# Patient Record
Sex: MALE | Race: WHITE | Employment: FULL TIME | URBAN - METROPOLITAN AREA
[De-identification: names, ages, dates, MRNs, and addresses within clinical notes are randomized per-mention and may not be internally consistent; named-entity substitution may affect disease eponyms.]

---

## 2017-02-21 RX ORDER — CARVEDILOL 12.5 MG/1
TABLET ORAL
Qty: 60 TAB | Refills: 1 | Status: SHIPPED | OUTPATIENT
Start: 2017-02-21 | End: 2017-03-13 | Stop reason: SDUPTHER

## 2017-03-13 ENCOUNTER — OFFICE VISIT (OUTPATIENT)
Dept: CARDIOLOGY CLINIC | Age: 62
End: 2017-03-13

## 2017-03-13 VITALS
RESPIRATION RATE: 16 BRPM | SYSTOLIC BLOOD PRESSURE: 124 MMHG | HEIGHT: 67 IN | HEART RATE: 71 BPM | WEIGHT: 180.1 LBS | DIASTOLIC BLOOD PRESSURE: 74 MMHG | BODY MASS INDEX: 28.27 KG/M2 | OXYGEN SATURATION: 97 %

## 2017-03-13 DIAGNOSIS — I25.10 ATHEROSCLEROSIS OF NATIVE CORONARY ARTERY OF NATIVE HEART WITHOUT ANGINA PECTORIS: ICD-10-CM

## 2017-03-13 DIAGNOSIS — Z95.5 S/P CORONARY ARTERY STENT PLACEMENT: ICD-10-CM

## 2017-03-13 DIAGNOSIS — I10 ESSENTIAL HYPERTENSION: ICD-10-CM

## 2017-03-13 DIAGNOSIS — E78.5 HYPERLIPIDEMIA, UNSPECIFIED HYPERLIPIDEMIA TYPE: Primary | ICD-10-CM

## 2017-03-13 NOTE — PROGRESS NOTES
3/13/2017 4:04 PM      Subjective:     Krishna Su is here for f/u visit. Doing very well from CV standpoint. He denies chest pain, chest pressure/discomfort, dyspnea, palpitations, irregular heart beats, near-syncope, syncope, fatigue, orthopnea, paroxysmal nocturnal dyspnea, exertional chest pressure/discomfort, claudication. Exercise regularly. Visit Vitals    /74 (BP 1 Location: Left arm, BP Patient Position: Sitting)    Pulse 71    Resp 16    Ht 5' 7\" (1.702 m)    Wt 180 lb 1.6 oz (81.7 kg)    SpO2 97%    BMI 28.21 kg/m2     Current Outpatient Prescriptions   Medication Sig    IRON, FERROUS SULFATE, PO Take  by mouth daily.  atorvastatin (LIPITOR) 40 mg tablet TAKE 1 TABLET BY MOUTH EVERY DAY    carvedilol (COREG) 12.5 mg tablet TAKE 1 TABLET BY MOUTH TWICE DAILY WITH FOOD    valsartan-hydrochlorothiazide (DIOVAN-HCT) 160-25 mg per tablet TAKE 1 TABLET BY MOUTH EVERY DAY    aspirin delayed-release (ASPIR-LOW) 81 mg tablet Take 1 Tab by mouth daily.  ascorbic acid (VITAMIN C) 1,000 mg tablet Take 1,000 mg by mouth daily.  fish oil-dha-epa 1,200-144-216 mg cap Take 1,000 mg by mouth daily.  folic acid 312 mcg tablet Take 800 mcg by mouth daily.  multivitamin (ONE A DAY) tablet Take 1 Tab by mouth daily.  gabapentin (NEURONTIN) 300 mg capsule Start taking 1 tablet qhs x 3 days, then increase to 1 tablet BID x 3 days, then increase to 1 tablet TID. No current facility-administered medications for this visit. Objective:      Visit Vitals    /74 (BP 1 Location: Left arm, BP Patient Position: Sitting)    Pulse 71    Resp 16    Ht 5' 7\" (1.702 m)    Wt 180 lb 1.6 oz (81.7 kg)    SpO2 97%    BMI 28.21 kg/m2       Data Review:     EKG: Normal sinus rhythm, LAD, inferior T wave inversion, unchanged.      Reviewed and/or ordered active problem list, medication list tests    Past Medical History:   Diagnosis Date    CAD (coronary artery disease)     Heart failure ([address]     S/P coronary artery stent placement 12/1/2014 12/1/14 PCI/LISE to OM1      Past Surgical History:   Procedure Laterality Date    CARDIAC SURG PROCEDURE UNLIST      3 stents placed Jan 2005.  HX OTHER SURGICAL      Cyst removed from next. No Known Allergies   Family History   Problem Relation Age of Onset    Hypertension Mother     Heart Attack Maternal Grandmother       Social History     Social History    Marital status:      Spouse name: N/A    Number of children: N/A    Years of education: N/A     Occupational History    Not on file. Social History Main Topics    Smoking status: Former Smoker    Smokeless tobacco: Not on file    Alcohol use Yes      Comment: Daily    Drug use: No    Sexual activity: Not on file     Other Topics Concern    Not on file     Social History Narrative         Review of Systems     General: Not Present- Anorexia, Chills, Dietary Changes, Fatigue, Fever, Medication Changes, Night Sweats, Weight Gain > 10lbs. and Weight Loss > 10lbs. .  Skin: Not Present- Bruising and Excessive Sweating. HEENT: Not Present- Headache, Visual Loss and Vertigo. Respiratory: Not Present- Cough, Decreased Exercise Tolerance, Difficulty Breathing, Snoring and Wheezing. Cardiovascular: Not Present- Abnormal Blood Pressure, Chest Pain, Claudications, Difficulty Breathing On Exertion, Edema, Fainting / Blacking Out, Irregular Heart Beat, Night Cramps, Orthopnea, Palpitations, Paroxysmal Nocturnal Dyspnea, Rapid Heart Rate, Shortness of Breath and Swelling of Extremities. Gastrointestinal: Not Present- Black, Tarry Stool, Bloody Stool, Diarrhea, Hematemesis, Rectal Bleeding and Vomiting. Musculoskeletal: Not Present- Muscle Pain and Muscle Weakness. Neurological: Not Present- Dizziness. Psychiatric: Not Present- Depression. Endocrine: Not Present- Cold Intolerance, Heat Intolerance and Thyroid Problems.   Hematology: Not Present- Abnormal Bleeding, Anemia, Blood Clots and Easy Bruising.       Physical Exam   The physical exam findings are as follows:       General   Mental Status - Alert. General Appearance - Not in acute distress. Chest and Lung Exam   Inspection: Accessory muscles - No use of accessory muscles in breathing. Auscultation:   Breath sounds: - Normal.      Cardiovascular   Inspection: Jugular vein - Bilateral - Inspection Normal.  Palpation/Percussion:   Apical Impulse: - Normal.  Auscultation: Rhythm - Regular. Heart Sounds - S1 WNL and S2 WNL. No S3 or S4. Murmurs & Other Heart Sounds: Auscultation of the heart reveals - No Murmurs. Carotid arteries - No Carotid bruit. Peripheral Vascular   Upper Extremity: Inspection - Bilateral - No Cyanotic nailbeds or Digital clubbing. Lower Extremity:   Palpation: Dorsalis pedis pulse - Bilateral - Normal. Posterior tibia pulse - Bilateral - Normal. Edema - Bilateral - No edema. Assessment:       ICD-10-CM ICD-9-CM    1. Hyperlipidemia, unspecified hyperlipidemia type E78.5 272.4 AMB POC EKG ROUTINE W/ 12 LEADS, INTER & REP      LIPID PANEL      METABOLIC PANEL, COMPREHENSIVE   2. Atherosclerosis of native coronary artery of native heart without angina pectoris I25.10 414.01 LIPID PANEL      METABOLIC PANEL, COMPREHENSIVE   3. Essential hypertension I10 401.9 LIPID PANEL      METABOLIC PANEL, COMPREHENSIVE   4. S/P coronary artery stent placement Z95.5 V45.82 LIPID PANEL      METABOLIC PANEL, COMPREHENSIVE       Plan:     1. CADI: s/p OM PCI after NSTEMI in 12/014. Stable. 2. HTN. controlled. Continue home meds. 3. Hyperlipidemia: on statin. Check labs. 4. Diet and exercise.

## 2017-03-13 NOTE — PROGRESS NOTES
Chief Complaint   Patient presents with    Cholesterol Problem     annual f/u    Hypertension     \"

## 2017-03-13 NOTE — MR AVS SNAPSHOT
Visit Information Date & Time Provider Department Dept. Phone Encounter #  
 3/13/2017  3:30 PM April Gamez, [address] [facility] [phone] [id number] Follow-up Instructions Return in about 1 year (around 3/13/2018). Upcoming Health Maintenance Date Due Hepatitis C Screening 1955 DTaP/Tdap/Td series (1 - Tdap) 7/6/1978 FOBT Q 1 YEAR AGE 50-75 7/6/2007 INFLUENZA AGE 9 TO ADULT 8/1/2016 Allergies as of 3/13/2017  Review Complete On: 3/13/2017 By: April Gamez MD  
 No Known Allergies Current Immunizations  Never Reviewed Name Date Influenza Vaccine 10/1/2014 Not reviewed this visit You Were Diagnosed With   
  
 Codes Comments Hyperlipidemia, unspecified hyperlipidemia type    -  Primary ICD-10-CM: E78.5 ICD-9-CM: 272.4 Atherosclerosis of native coronary artery of native heart without angina pectoris     ICD-10-CM: I25.10 ICD-9-CM: 414.01 Essential hypertension     ICD-10-CM: I10 
ICD-9-CM: 401.9 S/P coronary artery stent placement     ICD-10-CM: Z95.5 ICD-9-CM: V45.82 Vitals BP Pulse Resp Height(growth percentile) Weight(growth percentile) SpO2  
 124/74 (BP 1 Location: Left arm, BP Patient Position: Sitting) 71 16 5' 7\" (1.702 m) 180 lb 1.6 oz (81.7 kg) 97% BMI Smoking Status 28.21 kg/m2 Former Smoker Vitals History BMI and BSA Data Body Mass Index Body Surface Area  
 28.21 kg/m 2 1.97 m 2 Preferred Pharmacy Pharmacy Name Phone [id number] Christiane Barrera, [address] [facility] [phone] Your Updated Medication List  
  
   
This list is accurate as of: 3/13/17  4:05 PM.  Always use your most recent med list.  
  
  
  
  
 aspirin delayed-release 81 mg tablet Commonly known as:  ASPIR-LOW Take 1 Tab by mouth daily. atorvastatin 40 mg tablet Commonly known as:  LIPITOR TAKE 1 TABLET BY MOUTH EVERY DAY  
  
 carvedilol 12.5 mg tablet Commonly known as:  COREG  
TAKE 1 TABLET BY MOUTH TWICE DAILY WITH FOOD  
  
 fish oil-dha-epa 1,200-144-216 mg Cap Take 1,000 mg by mouth daily. folic acid 369 mcg tablet Take 800 mcg by mouth daily. gabapentin 300 mg capsule Commonly known as:  NEURONTIN Start taking 1 tablet qhs x 3 days, then increase to 1 tablet BID x 3 days, then increase to 1 tablet TID. IRON (FERROUS SULFATE) PO Take  by mouth daily. multivitamin tablet Commonly known as:  ONE A DAY Take 1 Tab by mouth daily. valsartan-hydroCHLOROthiazide 160-25 mg per tablet Commonly known as:  DIOVAN-HCT  
TAKE 1 TABLET BY MOUTH EVERY DAY  
  
 VITAMIN C 1,000 mg tablet Generic drug:  ascorbic acid (vitamin C) Take 1,000 mg by mouth daily. We Performed the Following AMB POC EKG ROUTINE W/ 12 LEADS, INTER & REP [17562 CPT(R)] LIPID PANEL [80916 CPT(R)] METABOLIC PANEL, COMPREHENSIVE [76472 CPT(R)] Follow-up Instructions Return in about 1 year (around 3/13/2018). Please provide this summary of care documentation to your next provider. Your primary care clinician is listed as [address] [facility]. If you have any questions after today's visit, please call [phone].

## 2017-03-29 LAB
ALBUMIN SERPL-MCNC: 4.4 G/DL (ref 3.5–5.5)
ALBUMIN/GLOB SERPL: 2.3 {RATIO} (ref 1.2–2.2)
ALP SERPL-CCNC: 45 IU/L (ref 39–117)
ALT SERPL-CCNC: 22 IU/L (ref 0–44)
AST SERPL-CCNC: 19 IU/L (ref 0–40)
BILIRUB SERPL-MCNC: 0.5 MG/DL (ref 0–1.2)
BUN SERPL-MCNC: 15 MG/DL (ref 6–24)
BUN/CREAT SERPL: 17 (ref 9–20)
CALCIUM SERPL-MCNC: 9.3 MG/DL (ref 8.7–10.2)
CHLORIDE SERPL-SCNC: 99 MMOL/L (ref 96–106)
CHOLEST SERPL-MCNC: 167 MG/DL (ref 100–199)
CO2 SERPL-SCNC: 26 MMOL/L (ref 18–29)
CREAT SERPL-MCNC: 0.87 MG/DL (ref 0.76–1.27)
GLOBULIN SER CALC-MCNC: 1.9 G/DL (ref 1.5–4.5)
GLUCOSE SERPL-MCNC: 96 MG/DL (ref 65–99)
HDLC SERPL-MCNC: 37 MG/DL
INTERPRETATION, 910389: NORMAL
LDLC SERPL CALC-MCNC: 101 MG/DL (ref 0–99)
POTASSIUM SERPL-SCNC: 4.2 MMOL/L (ref 3.5–5.2)
PROT SERPL-MCNC: 6.3 G/DL (ref 6–8.5)
SODIUM SERPL-SCNC: 139 MMOL/L (ref 134–144)
TRIGL SERPL-MCNC: 144 MG/DL (ref 0–149)
VLDLC SERPL CALC-MCNC: 29 MG/DL (ref 5–40)

## 2017-03-31 ENCOUNTER — TELEPHONE (OUTPATIENT)
Dept: CARDIOLOGY CLINIC | Age: 62
End: 2017-03-31

## 2017-03-31 NOTE — TELEPHONE ENCOUNTER
----- Message from Juan M Isbell MD sent at 3/31/2017  8:04 AM EDT -----  Inform him labs are ok. Continue current med. Follow diet and exercise. Called pt and left message to call me back. Returned pt's call,verified pt with two pt identifiers, told pt his labs are okay,continue current meds and follow diet and exercise. He verbalized that he understood everything.

## 2017-04-13 RX ORDER — VALSARTAN AND HYDROCHLOROTHIAZIDE 160; 25 MG/1; MG/1
TABLET ORAL
Qty: 90 TAB | Refills: 0 | Status: SHIPPED | OUTPATIENT
Start: 2017-04-13 | End: 2017-07-07 | Stop reason: SDUPTHER

## 2017-04-26 RX ORDER — CARVEDILOL 12.5 MG/1
TABLET ORAL
Qty: 60 TAB | Refills: 1 | Status: SHIPPED | OUTPATIENT
Start: 2017-04-26 | End: 2017-06-12 | Stop reason: SDUPTHER

## 2017-05-11 RX ORDER — ATORVASTATIN CALCIUM 40 MG/1
TABLET, FILM COATED ORAL
Qty: 90 TAB | Refills: 0 | Status: SHIPPED | OUTPATIENT
Start: 2017-05-11 | End: 2017-08-02 | Stop reason: SDUPTHER

## 2017-06-12 RX ORDER — CARVEDILOL 12.5 MG/1
TABLET ORAL
Qty: 60 TAB | Refills: 1 | Status: SHIPPED | OUTPATIENT
Start: 2017-06-12 | End: 2017-09-04 | Stop reason: SDUPTHER

## 2017-07-10 RX ORDER — VALSARTAN AND HYDROCHLOROTHIAZIDE 160; 25 MG/1; MG/1
TABLET ORAL
Qty: 90 TAB | Refills: 0 | Status: SHIPPED | OUTPATIENT
Start: 2017-07-10 | End: 2017-10-09

## 2017-08-02 RX ORDER — ATORVASTATIN CALCIUM 40 MG/1
TABLET, FILM COATED ORAL
Qty: 90 TAB | Refills: 0 | Status: SHIPPED | OUTPATIENT
Start: 2017-08-02 | End: 2017-10-30 | Stop reason: SDUPTHER

## 2017-09-04 RX ORDER — CARVEDILOL 12.5 MG/1
TABLET ORAL
Qty: 60 TAB | Refills: 1 | Status: SHIPPED | OUTPATIENT
Start: 2017-09-04 | End: 2017-10-09

## 2017-10-09 ENCOUNTER — OFFICE VISIT (OUTPATIENT)
Dept: NEUROLOGY | Age: 62
End: 2017-10-09

## 2017-10-09 VITALS
BODY MASS INDEX: 28.66 KG/M2 | RESPIRATION RATE: 18 BRPM | SYSTOLIC BLOOD PRESSURE: 132 MMHG | OXYGEN SATURATION: 98 % | WEIGHT: 183 LBS | DIASTOLIC BLOOD PRESSURE: 86 MMHG

## 2017-10-09 DIAGNOSIS — G56.21 ULNAR NEUROPATHY OF RIGHT UPPER EXTREMITY: ICD-10-CM

## 2017-10-09 DIAGNOSIS — G56.01 CARPAL TUNNEL SYNDROME OF RIGHT WRIST: ICD-10-CM

## 2017-10-09 DIAGNOSIS — R20.2 PARESTHESIA OF LEFT ARM: Primary | ICD-10-CM

## 2017-10-09 RX ORDER — VALSARTAN AND HYDROCHLOROTHIAZIDE 160; 25 MG/1; MG/1
TABLET ORAL
Qty: 90 TAB | Refills: 0 | Status: SHIPPED | OUTPATIENT
Start: 2017-10-09 | End: 2018-01-09 | Stop reason: SDUPTHER

## 2017-10-09 NOTE — MR AVS SNAPSHOT
Visit Information Date & Time Provider Department Dept. Phone Encounter #  
 10/9/2017 11:40 AM Kandace Libman, DO [facility] [phone] Upcoming Health Maintenance Date Due Hepatitis C Screening 1955 DTaP/Tdap/Td series (1 - Tdap) 7/6/1978 FOBT Q 1 YEAR AGE 50-75 7/6/2007 ZOSTER VACCINE AGE 60> 5/6/2017 INFLUENZA AGE 9 TO ADULT 8/1/2017 Allergies as of 10/9/2017  Review Complete On: 10/9/2017 By: Kandace Libman, DO No Known Allergies Current Immunizations  Never Reviewed Name Date Influenza Vaccine 10/1/2014 Not reviewed this visit You Were Diagnosed With   
  
 Codes Comments Paresthesia of left arm    -  Primary ICD-10-CM: R20.2 ICD-9-CM: 782.0 Carpal tunnel syndrome of right wrist     ICD-10-CM: G56.01 
ICD-9-CM: 354.0 Ulnar neuropathy of right upper extremity     ICD-10-CM: G56.21 ICD-9-CM: 209. 2 Vitals BP Resp Weight(growth percentile) SpO2 BMI Smoking Status 132/86 18 183 lb (83 kg) 98% 28.66 kg/m2 Former Smoker BMI and BSA Data Body Mass Index Body Surface Area  
 28.66 kg/m 2 1.98 m 2 Preferred Pharmacy Pharmacy Name Phone [id number] Ryley Ford, [address] [place] [address] [phone] Your Updated Medication List  
  
   
This list is accurate as of: 10/9/17 11:57 AM.  Always use your most recent med list.  
  
  
  
  
 aspirin delayed-release 81 mg tablet Commonly known as:  ASPIR-LOW Take 1 Tab by mouth daily. atorvastatin 40 mg tablet Commonly known as:  LIPITOR  
TAKE 1 TABLET BY MOUTH EVERY DAY  
  
 * carvedilol 12.5 mg tablet Commonly known as:  COREG  
TAKE 1 TABLET BY MOUTH TWICE DAILY WITH FOOD * carvedilol 12.5 mg tablet Commonly known as:  COREG  
TAKE 1 TABLET BY MOUTH TWICE DAILY WITH FOOD  
  
 fish oil-dha-epa 1,200-144-216 mg Cap Take 1,000 mg by mouth daily. folic acid 745 mcg tablet Take 800 mcg by mouth daily. gabapentin 300 mg capsule Commonly known as:  NEURONTIN Start taking 1 tablet qhs x 3 days, then increase to 1 tablet BID x 3 days, then increase to 1 tablet TID. IRON (FERROUS SULFATE) PO Take  by mouth daily. multivitamin tablet Commonly known as:  ONE A DAY Take 1 Tab by mouth daily. * valsartan-hydroCHLOROthiazide 160-25 mg per tablet Commonly known as:  DIOVAN-HCT  
TAKE 1 TABLET BY MOUTH EVERY DAY  
  
 * valsartan-hydroCHLOROthiazide 160-25 mg per tablet Commonly known as:  DIOVAN-HCT  
TAKE 1 TABLET BY MOUTH EVERY DAY  
  
 VITAMIN C 1,000 mg tablet Generic drug:  ascorbic acid (vitamin C) Take 1,000 mg by mouth daily. * Notice: This list has 4 medication(s) that are the same as other medications prescribed for you. Read the directions carefully, and ask your doctor or other care provider to review them with you. To-Do List   
 10/09/2017 Neurology:  [facility] Patient Instructions A Healthy Lifestyle: Care Instructions Your Care Instructions A healthy lifestyle can help you feel good, stay at a healthy weight, and have plenty of energy for both work and play. A healthy lifestyle is something you can share with your whole family. A healthy lifestyle also can lower your risk for serious health problems, such as high blood pressure, heart disease, and diabetes. You can follow a few steps listed below to improve your health and the health of your family. Follow-up care is a key part of your treatment and safety. Be sure to make and go to all appointments, and call your doctor if you are having problems. Its also a good idea to know your test results and keep a list of the medicines you take. How can you care for yourself at home? · Do not eat too much sugar, fat, or fast foods.  You can still have dessert and treats now and then. The goal is moderation. · Start small to improve your eating habits. Pay attention to portion sizes, drink less juice and soda pop, and eat more fruits and vegetables. ¨ Eat a healthy amount of food. A 3-ounce serving of meat, for example, is about the size of a deck of cards. Fill the rest of your plate with vegetables and whole grains. ¨ Limit the amount of soda and sports drinks you have every day. Drink more water when you are thirsty. ¨ Eat at least 5 servings of fruits and vegetables every day. It may seem like a lot, but it is not hard to reach this goal. A serving or helping is 1 piece of fruit, 1 cup of vegetables, or 2 cups of leafy, raw vegetables. Have an apple or some carrot sticks as an afternoon snack instead of a candy bar. Try to have fruits and/or vegetables at every meal. 
· Make exercise part of your daily routine. You may want to start with simple activities, such as walking, bicycling, or slow swimming. Try to be active 30 to 60 minutes every day. You do not need to do all 30 to 60 minutes all at once. For example, you can exercise 3 times a day for 10 or 20 minutes. Moderate exercise is safe for most people, but it is always a good idea to talk to your doctor before starting an exercise program. 
· Keep moving. Karyna Ramachandran the lawn, work in the garden, or Punctil. Take the stairs instead of the elevator at work. · If you smoke, quit. People who smoke have an increased risk for heart attack, stroke, cancer, and other lung illnesses. Quitting is hard, but there are ways to boost your chance of quitting tobacco for good. ¨ Use nicotine gum, patches, or lozenges. ¨ Ask your doctor about stop-smoking programs and medicines. ¨ Keep trying.  
In addition to reducing your risk of diseases in the future, you will notice some benefits soon after you stop using tobacco. If you have shortness of breath or asthma symptoms, they will likely get better within a few weeks after you quit. · Limit how much alcohol you drink. Moderate amounts of alcohol (up to 2 drinks a day for men, 1 drink a day for women) are okay. But drinking too much can lead to liver problems, high blood pressure, and other health problems. Family health If you have a family, there are many things you can do together to improve your health. · Eat meals together as a family as often as possible. · Eat healthy foods. This includes fruits, vegetables, lean meats and dairy, and whole grains. · Include your family in your fitness plan. Most people think of activities such as jogging or tennis as the way to fitness, but there are many ways you and your family can be more active. Anything that makes you breathe hard and gets your heart pumping is exercise. Here are some tips: 
¨ Walk to do errands or to take your child to school or the bus. ¨ Go for a family bike ride after dinner instead of watching TV. Where can you learn more? Go to http://kesha-skye.info/. Enter M182 in the search box to learn more about \"A Healthy Lifestyle: Care Instructions. \" Current as of: July 26, 2016 Content Version: 11.3 © 9035-5464 [employer]. Care instructions adapted under license by [employer] (which disclaims liability or warranty for this information). If you have questions about a medical condition or this instruction, always ask your healthcare professional. [place] [address] any warranty or liability for your use of this information. Introducing [facility] & HEALTH SERVICES! [facility] introduces Stealth10 patient portal. Now you can access parts of your medical record, email your doctor's office, and request medication refills online. 1. In your internet browser, go to [email or web address] 2. Click on the First Time User? Click Here link in the Sign In box. You will see the New Member Sign Up page. 3. Enter your Trippy Access Code exactly as it appears below. You will not need to use this code after youve completed the sign-up process. If you do not sign up before the expiration date, you must request a new code. · Trippy Access Code: [email or web address] Expires: 1/7/2018 11:38 AM 
 
4. Enter the last four digits of your Social Security Number (xxxx) and Date of Birth (mm/dd/yyyy) as indicated and click Submit. You will be taken to the next sign-up page. 5. Create a Trippy ID. This will be your Trippy login ID and cannot be changed, so think of one that is secure and easy to remember. 6. Create a Trippy password. You can change your password at any time. 7. Enter your Password Reset Question and Answer. This can be used at a later time if you forget your password. 8. Enter your e-mail address. You will receive e-mail notification when new information is available in [address] 9. Click Sign Up. You can now view and download portions of your medical record. 10. Click the Download Summary menu link to download a portable copy of your medical information. If you have questions, please visit the Frequently Asked Questions section of the Trippy website. Remember, Trippy is NOT to be used for urgent needs. For medical emergencies, dial 911. Now available from your iPhone and Android! Please provide this summary of care documentation to your next provider. If you have any questions after today's visit, please call [phone].

## 2017-10-09 NOTE — PROGRESS NOTES
Neurology Clinic Follow up Note    Patient ID:  Anabella Amador  [id number]  30 y.o.  1955      Mr. Misha Queen is here for follow up today of     Last Appointment With Me:  Dr. Luis Madrid 4/18/16    Interval History:   Pt reports his left arm from the elbow down is tingling and numb, worse in the evenings. No associated weakness. No prior LUE EMG. Also still having sharp stabbing pain over the R elbow when lifting weights as well as numbness tingling involving the entire hand. He feels sx are progressive. He endorses ongoing weakness into the R hand when gripping objects. No similar sx into the LE. Not taking anything for his neuropathic pain sx presently and not interesting in starting medication for neuropathic pain. Prior MRI C spine with cervical spondylosis C4-5, C5-6 and C6-7 with minimal/mild stenosis, no cord compression and left foramina stenosis C4-5       PMHx/ PSHx/ FHx/ SHx:  Reviewed and unchanged previous visit. Past Medical History:   Diagnosis Date    CAD (coronary artery disease)     Heart failure ([facility] [address]     S/P coronary artery stent placement 12/1/2014 12/1/14 PCI/LISE to OM1       ROS:  Comprehensive review of systems negative except for as noted above. Objective:       Meds:  Current Outpatient Prescriptions   Medication Sig Dispense Refill    atorvastatin (LIPITOR) 40 mg tablet TAKE 1 TABLET BY MOUTH EVERY DAY 90 Tab 0    IRON, FERROUS SULFATE, PO Take  by mouth daily.  carvedilol (COREG) 12.5 mg tablet TAKE 1 TABLET BY MOUTH TWICE DAILY WITH FOOD 180 Tab 0    valsartan-hydrochlorothiazide (DIOVAN-HCT) 160-25 mg per tablet TAKE 1 TABLET BY MOUTH EVERY DAY 90 Tab 0    aspirin delayed-release (ASPIR-LOW) 81 mg tablet Take 1 Tab by mouth daily. 90 Tab 6    ascorbic acid (VITAMIN C) 1,000 mg tablet Take 1,000 mg by mouth daily.  fish oil-dha-epa 1,200-144-216 mg cap Take 1,000 mg by mouth daily.  folic acid 680 mcg tablet Take 800 mcg by mouth daily.  multivitamin (ONE A DAY) tablet Take 1 Tab by mouth daily. Exam:  Visit Vitals    /86    Resp 18    Wt 83 kg (183 lb)    SpO2 98%    BMI 28.66 kg/m2     NEUROLOGICAL EXAM:  General: Awake, alert, speech fluent  CN: PERRL, EOMI without nystagmus, VFF to confrontation, facial sensation and strength are normal and symmetric, hearing is intact to finger rub bilaterally, palate and tongue movements are intact and symmetric. Motor: Normal tone and muscle bulk with no pronator drift. No atrophy or fasciculations present on examination. Individual muscle group testing:  Shoulder abduction:   Left:5/5   Right : 5/5    Shoulder adduction:   Left:5/5   Right : 5/5    Elbow flexion:      Left:5/5   Right : 5/5  Elbow extension:    Left:5/5   Right : 5/5   Wrist flexion:    Left:5/5   Right : 5/5  Wrist extension:    Left:5/5   Right : 5/5  Arm pronation:   Left:5/5   Right : 5/5  Arm supination:   Left:5/5   Right : 5/5    Finger flexion:    Left:5/5   Right : 5/5    Finger extension:   Left:5/5   Right : 5/5   Finger abduction:  Left:5/5   Right : 4+/5   Finger adduction:   Left:5/5   Right : 5/5  FDI:     Left:5/5   Right : 4+/5     ADM:     Left:5/5   Right : 4+/5   FDP 2-3:    Left:5/5   Right : 5/5    FDP 4-5:    Left:5/5   Right : 5-/5   FPL:     Left:5/5  Right : 5/5   APB:     Left:5-/5   Right : 5-/5    Hip flexion:     Left:5/5   Right : 5/5         Hip extension:   Left:5/5   Right : 5/5    Knee flexion:     Left:5/5   Right : 5/5    Knee extension:   Left:5/5   Right : 5/5    Dorsiflexion:     Left:5/5   Right : 5/5  Plantar flexion:    Left:5/5   Right : 5/5    Foot inversion:    Left:5/5   Right : 5/5   Foot eversion:    Left:5/5   Right : 5/5  Toe flexion:      Left:5/5   Right : 5/5          Toe extension:    Left:5/5   Right : 5/5    · MSRs: No crossed adductors or clonus.          RIGHT  LEFT   Brachioradialis 3+ 3+   Biceps 3+ 3+   Triceps 2+ 2+   Knee 2+ 2+   Achilles 1+ 1+ Plantar response Downward Downward   Rojas/Troemner signs Negative Negative     · Sensation: Normal and symmetric throughout  · Coordination: No ataxia  · Gait: Normal-based, steady          LABS  Results for orders placed or performed in visit on 03/13/17   LIPID PANEL   Result Value Ref Range    Cholesterol, total 167 100 - 199 mg/dL    Triglyceride 144 0 - 149 mg/dL    HDL Cholesterol 37 (L) >39 mg/dL    VLDL, calculated 29 5 - 40 mg/dL    LDL, calculated 101 (H) 0 - 99 mg/dL   METABOLIC PANEL, COMPREHENSIVE   Result Value Ref Range    Glucose 96 65 - 99 mg/dL    BUN 15 6 - 24 mg/dL    Creatinine 0.87 0.76 - 1.27 mg/dL    GFR est non-AA 94 >59 mL/min/1.73    GFR est  >59 mL/min/1.73    BUN/Creatinine ratio 17 9 - 20    Sodium 139 134 - 144 mmol/L    Potassium 4.2 3.5 - 5.2 mmol/L    Chloride 99 96 - 106 mmol/L    CO2 26 18 - 29 mmol/L    Calcium 9.3 8.7 - 10.2 mg/dL    Protein, total 6.3 6.0 - 8.5 g/dL    Albumin 4.4 3.5 - 5.5 g/dL    GLOBULIN, TOTAL 1.9 1.5 - 4.5 g/dL    A-G Ratio 2.3 (H) 1.2 - 2.2    Bilirubin, total 0.5 0.0 - 1.2 mg/dL    Alk. phosphatase 45 39 - 117 IU/L    AST (SGOT) 19 0 - 40 IU/L    ALT (SGPT) 22 0 - 44 IU/L   CVD REPORT   Result Value Ref Range    INTERPRETATION Note        IMAGING:  MRI Results (most recent):    Results from [place] encounter on 02/05/16   MRI CERV SPINE WO CONT   Narrative **Final Report**      ICD Codes / Adm. Diagnosis: 728.87  M62.81 / Muscle weakness (generalized)    Muscle weakness (generalized)  Examination:  MR C SPINE WO CON  - [id number] - Feb 5 2016  3:15PM  Accession No:  [id number]  Reason:  distal RUE weakness      REPORT:  EXAM:  MR C SPINE WO CON  INDICATION:  distal RUE weakness, loss mobility right hand, M62.81, right   wrist pain  TECHNIQUE: Sagittal T1, T2, STIR and axial T1 and T2-weighted images of the   cervical spine were obtained. COMPARISON: None available.   FINDINGS:  The cervical vertebra are in good alignment  The craniocervical junction is  unremarkable. C2-C3: No disc bulge or stenosis. The right facet joint has fused. C3-C4: Mild loss of disc space height and signal greater on the right. Minimal disc bulge and endplate hypertrophy. Chronic right facet arthrosis. At least mild right foramina stenosis. C4-C5: Loss of disc space height. Mild diffuse disc bulge and endplate   osteophyte formation. Mild stenosis without cord compression. Left foramina   stenosis. C5-C6: Chronic near total loss of disc space height with mild endplate   osteophyte formation. Mild stenosis without cord compression. Left foramina   stenosis. C6-C7: Chronic near total loss of disc space height with some residual disc   bulging and mild endplate hypertrophy. Minimal stenosis. Mild foramina   stenosis. .  C7-T1: No significant disc bulge or stenosis. Minimal/mild facet arthrosis. T1-T2: No significant disc bulge or stenosis. The spinal cord has normal signal throughout. IMPRESSION:   1. Chronic findings of cervical spondylosis C4-5, C5-6 and C6-7 with   minimal/mild stenosis, no cord compression and left foramina stenosis C4-5   and C5-6.  2. C3-4 right greater than left facet arthrosis. Minimal stenosis. 3. C2-3 acquired chronic fusion right facet joint. Signing/Reading Doctor: Elder Bocanegra ([id number])    Approved: Elder Bocanegra ([id number])  2016  3:53PM                                 EMG  Impression:  Extensive electrodiagnostic evaluation of the right upper extremity and related paraspinal muscles reveals the followin. Decreased right ulnar motor conduction velocity across the elbow, mild in degree electrically.  Needle electrode examination of ulnar innervated myotomes is normal. In the context of prior weakness involving right ulnar-innervated muscles and subsequent clinical improvement prior to electrodiagnostic testing, findings are suggestive of a resolving mild right ulnar motor demyelinating neuropathy localized to the elbow. 6. Right median neuropathy at or distal to the wrist, consistent with a clinical diagnosis of carpal tunnel syndrome, mild in degree electrically. 7. No evidence of a superimposed right cervical motor radiculopathy. Assessment:     Encounter Diagnoses     ICD-10-CM ICD-9-CM   1. Paresthesia of left arm R20.2 782.0   2. Carpal tunnel syndrome of right wrist G56.01 354.0   3. Ulnar neuropathy of right upper extremity G56.21 354.2     64 yo gentleman h/o HTN, CAD/stents, CHF, R CTS release 2011 previously followed for R distal extremity paresthesias. EMG confirming mild R ulnar motor demyelinating neuropathy localized to the elbow and mild R CTS. Pt now reporting LUE paresthesias from elbow to distal hand involving all fingers as well as ongoing paresthesias/weakness of the distal RUE which he feels has progressed. Prior MRI C spine with cervical spondylosis C4-5, C5-6 and C6-7 with minimal/mild stenosis, no cord compression and left foramina stenosis C4-5. Findings do not explain existing sx. Will proceed with electrodiagnostic testing to assess for L ulnar neuropathy as well as CTS and evaluate for progression of pre-existing R CTS/R Ulnar neuropathy.      Plan:   EMG b/l UE    Follow-up Disposition: F/U TBD after EMG      Signed:  Angeline Kehr, DO  10/9/2017  11:43 AM

## 2017-10-09 NOTE — PATIENT INSTRUCTIONS

## 2017-10-30 RX ORDER — CARVEDILOL 12.5 MG/1
TABLET ORAL
Qty: 60 TAB | Refills: 1 | Status: SHIPPED | OUTPATIENT
Start: 2017-10-30 | End: 2017-12-30 | Stop reason: SDUPTHER

## 2017-10-30 RX ORDER — ATORVASTATIN CALCIUM 40 MG/1
TABLET, FILM COATED ORAL
Qty: 90 TAB | Refills: 0 | Status: SHIPPED | OUTPATIENT
Start: 2017-10-30 | End: 2018-02-04 | Stop reason: SDUPTHER

## 2017-11-15 ENCOUNTER — OFFICE VISIT (OUTPATIENT)
Dept: NEUROLOGY | Age: 62
End: 2017-11-15

## 2017-11-15 VITALS
DIASTOLIC BLOOD PRESSURE: 89 MMHG | HEART RATE: 68 BPM | WEIGHT: 183 LBS | SYSTOLIC BLOOD PRESSURE: 143 MMHG | HEIGHT: 67 IN | RESPIRATION RATE: 18 BRPM | OXYGEN SATURATION: 93 % | BODY MASS INDEX: 28.72 KG/M2

## 2017-11-15 DIAGNOSIS — G56.03 BILATERAL CARPAL TUNNEL SYNDROME: Primary | ICD-10-CM

## 2017-11-15 NOTE — PROGRESS NOTES
[facility]rology [facility]  [address] [place] [address]  Phone [phone] Fax [phone]  Test Date:  11/15/2017    Patient: Desi Lagos : 1955 Physician: Gage Lo    Sex: Male Height: 5' 7\" Ref Miranda Winkler   ID#: [id number] Weight: 183 lbs. Technician: Alex Bowman     Patient Complaints:  LUE PARESTHESIAS       NCV & EMG Findings:  Evaluation of the left median motor nerve showed prolonged distal onset latency (5.5 ms). The left median sensory nerve showed no response (Wrist). The right median sensory nerve showed prolonged distal peak latency (4.0 ms) and decreased conduction velocity (Wrist-2nd Digit, 35 m/s). All remaining nerves  were within normal limits. All F Wave latencies were within normal limits. All F Wave left vs. right side latency differences were within normal limits. All examined muscles (as indicated in the following table) showed no evidence of electrical instability. Impression:  Extensive electrodiagnostic examination of the left upper extremity with additional nerve conduction studies of the right upper extremity reveal changes most consistent with the followin. Bilateral median neuropathies at or distal to the wrists, consistent with a clinical diagnosis of carpal tunnel syndrome, severe in degree electrically on the left and mild on the right. 2. No evidence of a left cervical motor radiculopathy. ___________________________  Melo Gan DO  Staff Neurologist  Diplomate, American Board of Psychiatry & Neurology             Nerve Conduction Studies  Anti Sensory Summary Table     Stim Site NR Peak (ms) Norm Peak (ms) P-T Amp (µV) Norm P-T Amp Onset (ms) Site1 Site2 Delta-P (ms) Dist (cm) Carl (m/s) Norm Carl (m/s)   Left Median Anti Sensory (2nd Digit)  32.3°C   Wrist NR  <3.6  >10  Wrist 2nd Digit  14.0  >39   Right Median Anti Sensory (2nd Digit)  33.7°C   Wrist    4.0 <3.6 13.1 >10 3.3 Wrist 2nd Digit 4.0 14.0 35 >39   Left Radial Anti Sensory (Base 1st Digit)  32.7°C   Wrist    2.4 <3.1 31.4  1.8 Wrist Base 1st Digit 2.4 0.0     Right Radial Anti Sensory (Base 1st Digit)  33.5°C   Wrist    2.1 <3.1 41.2  1.8 Wrist Base 1st Digit 2.1 0.0     Left Ulnar Anti Sensory (5th Digit)  32.2°C   Wrist    3.1 <3.7 16.2 >15.0 2.6 Wrist 5th Digit 3.1 14.0 45 >38   Right Ulnar Anti Sensory (5th Digit)  33.7°C   Wrist    2.8 <3.7 18.6 >15.0 2.2 Wrist 5th Digit 2.8 14.0 50 >38     Motor Summary Table     Stim Site NR Onset (ms) Norm Onset (ms) O-P Amp (mV) Norm O-P Amp Site1 Site2 Delta-0 (ms) Dist (cm) Carl (m/s) Norm Carl (m/s)   Left Median Motor (Abd Poll Brev)  32.4°C   Wrist    5.5 <4.2 6.9 >5 Elbow Wrist 4.4 25.0 57 >50   Elbow    9.9  6.9          Right Median Motor (Abd Poll Brev)  33.8°C   Wrist    3.5 <4.2 7.6 >5 Elbow Wrist 3.8 25.0 66 >50   Elbow    7.3  7.6          Left Ulnar Motor (Abd Dig Minimi)  32.7°C   Wrist    2.2 <4.2 9.7 >3 B Elbow Wrist 4.0 24.0 60 >53   B Elbow    6.2  8.6  A Elbow B Elbow 1.1 10.0 91 >53   A Elbow    7.3  7.5          Right Ulnar Motor (Abd Dig Minimi)  33.9°C   Wrist    2.5 <4.2 9.7 >3 B Elbow Wrist 3.7 23.0 62 >53   B Elbow    6.2  8.4  A Elbow B Elbow 1.3 10.0 77 >53   A Elbow    7.5  8.3            F Wave Studies     NR F-Lat (ms) Lat Norm (ms) L-R F-Lat (ms) L-R Lat Norm   Left Ulnar (Mrkrs) (Abd Dig Min)  32.6°C      29.87 <36 1.41 <2.5   Right Ulnar (Mrkrs) (Abd Dig Min)  33.8°C      28.46 <36 1.41 <2.5     EMG     Side Muscle Nerve Root Ins Act Fibs Psw Amp Dur Poly Recrt Int Pat Comment   Left 1stDorInt Ulnar C8-T1 Nml Nml Nml Nml Nml 0 Nml Nml    Left FlexPolLong Median (Ant Int) C7-8 Nml Nml Nml Nml Nml 0 Nml Nml    Left Ext Indicis Radial (Post Int) C7-8 Nml Nml Nml Nml Nml 0 Nml Nml    Left PronatorTeres Median C6-7 Nml Nml Nml Nml Nml 0 Nml Nml    Left Biceps Musculocut C5-6 Nml Nml Nml Nml Nml 0 Nml Nml Left Triceps Radial C6-7-8 Nml Nml Nml Nml Nml 0 Nml Nml    Left Deltoid Axillary C5-6 Nml Nml Nml Nml Nml 0 Nml Nml          Waveforms:

## 2017-11-15 NOTE — Clinical Note
Sent you a referral for L CTS release on this gentleman. He is hoping to get this done before the end of the year if possible. Thank you.   Mallika Nuñez

## 2017-11-29 ENCOUNTER — TELEPHONE (OUTPATIENT)
Dept: NEUROLOGY | Age: 62
End: 2017-11-29

## 2017-11-30 ENCOUNTER — DOCUMENTATION ONLY (OUTPATIENT)
Dept: NEUROLOGY | Age: 62
End: 2017-11-30

## 2017-12-30 RX ORDER — CARVEDILOL 12.5 MG/1
TABLET ORAL
Qty: 60 TAB | Refills: 1 | Status: SHIPPED | OUTPATIENT
Start: 2017-12-30 | End: 2018-03-07 | Stop reason: SDUPTHER

## 2018-01-09 RX ORDER — VALSARTAN AND HYDROCHLOROTHIAZIDE 160; 25 MG/1; MG/1
TABLET ORAL
Qty: 90 TAB | Refills: 0 | Status: SHIPPED | OUTPATIENT
Start: 2018-01-09

## 2018-03-07 RX ORDER — CARVEDILOL 12.5 MG/1
TABLET ORAL
Qty: 60 TAB | Refills: 1 | Status: SHIPPED | OUTPATIENT
Start: 2018-03-07 | End: 2018-05-09 | Stop reason: SDUPTHER

## 2018-05-09 RX ORDER — CARVEDILOL 12.5 MG/1
TABLET ORAL
Qty: 60 TAB | Refills: 1 | Status: SHIPPED | OUTPATIENT
Start: 2018-05-09 | End: 2018-07-03 | Stop reason: SDUPTHER

## 2018-07-03 RX ORDER — CARVEDILOL 12.5 MG/1
TABLET ORAL
Qty: 60 TAB | Refills: 1 | Status: SHIPPED | OUTPATIENT
Start: 2018-07-03 | End: 2018-09-05 | Stop reason: SDUPTHER

## 2018-09-04 ENCOUNTER — HOSPITAL ENCOUNTER (OUTPATIENT)
Dept: GENERAL RADIOLOGY | Age: 63
Discharge: HOME | End: 2018-09-04
Payer: COMMERCIAL

## 2018-09-04 DIAGNOSIS — M79.18 PAIN IN BUTTOCK: ICD-10-CM

## 2018-09-04 PROCEDURE — 72170 X-RAY EXAM OF PELVIS: CPT

## 2018-09-05 RX ORDER — CARVEDILOL 12.5 MG/1
TABLET ORAL
Qty: 60 TAB | Refills: 1 | Status: SHIPPED | OUTPATIENT
Start: 2018-09-05 | End: 2018-11-07 | Stop reason: SDUPTHER

## 2018-11-09 RX ORDER — CARVEDILOL 12.5 MG/1
TABLET ORAL
Qty: 60 TAB | Refills: 1 | Status: SHIPPED | OUTPATIENT
Start: 2018-11-09 | End: 2019-01-08 | Stop reason: SDUPTHER

## 2019-01-07 ENCOUNTER — TELEPHONE (OUTPATIENT)
Dept: CARDIOLOGY CLINIC | Age: 64
End: 2019-01-07

## 2019-01-07 NOTE — TELEPHONE ENCOUNTER
Last visit 3/31/17-needs an appt and labs. Called pt and left message to call me back. Called pt and left 2nd message to call me back. 1/15/19.

## 2019-01-08 RX ORDER — CARVEDILOL 12.5 MG/1
TABLET ORAL
Qty: 60 TAB | Refills: 1 | Status: SHIPPED | OUTPATIENT
Start: 2019-01-08 | End: 2021-04-05

## 2019-01-21 NOTE — TELEPHONE ENCOUNTER
Faxed refill request to pharmacy at [phone] stating that pt needs lab/office visit for further refills on Coreg/Lipitor. Received fax confirmation.

## 2019-02-06 RX ORDER — ATORVASTATIN CALCIUM 40 MG/1
TABLET, FILM COATED ORAL
Qty: 90 TAB | Refills: 3 | OUTPATIENT
Start: 2019-02-06

## 2019-02-06 NOTE — TELEPHONE ENCOUNTER
LAST VISIT 3/13/17. LAST LABS 2017. I CONTACTED PT ON 1/7/19 BUT NEVER GOT A HOLD OF PT. I SENT NOTE TO PHARMACY TO NOT REFILL MEDICATIONS UNTIL PT F/U IN OFFICE. Called pt,verified pt with two pt identifiers, told pt that we had last seen him in 2017 and his last labs were in 2017. Advised that we needed to see him before we could refill medications. Pt advised that he did move and has a new cardiologist. Margie Patron him that his pharmacy is still sending us the refill request for his medications so he needs to let the pharmacy know that we are no longer his dr office. Pt verbalized understanding. Called pharmacy to let them know that pt is no longer at our office and they need to contact the pt in regards to his new cardiology office. She took us off of pt's record and advised they would contact pt.

## 2019-02-06 NOTE — TELEPHONE ENCOUNTER
No labs since 2017, if PCP drawing labs have pharmacy contact PCP to fill Lipitor, if patient has not had recent labs then send RX for 30 days and send script for CMP CK Lipids

## 2020-10-29 ENCOUNTER — HOSPITAL ENCOUNTER (OUTPATIENT)
Dept: GENERAL RADIOLOGY | Age: 65
Discharge: HOME | End: 2020-10-29
Payer: COMMERCIAL

## 2020-10-29 ENCOUNTER — TRANSCRIBE ORDER (OUTPATIENT)
Dept: GENERAL RADIOLOGY | Age: 65
End: 2020-10-29

## 2020-10-29 DIAGNOSIS — R22.32 SUBCUTANEOUS MASS OF LEFT THUMB: ICD-10-CM

## 2020-10-29 DIAGNOSIS — R22.32 SUBCUTANEOUS MASS OF LEFT THUMB: Primary | ICD-10-CM

## 2020-10-29 PROCEDURE — 73140 X-RAY EXAM OF FINGER(S): CPT | Performed by: FAMILY MEDICINE

## 2021-04-02 ENCOUNTER — TELEPHONE (OUTPATIENT)
Dept: NEUROLOGY | Age: 66
End: 2021-04-02

## 2021-04-05 ENCOUNTER — OFFICE VISIT (OUTPATIENT)
Dept: NEUROLOGY | Age: 66
End: 2021-04-05
Payer: COMMERCIAL

## 2021-04-05 VITALS
DIASTOLIC BLOOD PRESSURE: 70 MMHG | HEART RATE: 70 BPM | RESPIRATION RATE: 18 BRPM | OXYGEN SATURATION: 98 % | SYSTOLIC BLOOD PRESSURE: 122 MMHG

## 2021-04-05 DIAGNOSIS — G25.81 RLS (RESTLESS LEGS SYNDROME): ICD-10-CM

## 2021-04-05 DIAGNOSIS — G47.61 PERIODIC LIMB MOVEMENTS OF SLEEP: Primary | ICD-10-CM

## 2021-04-05 PROCEDURE — 99204 OFFICE O/P NEW MOD 45 MIN: CPT | Performed by: PSYCHIATRY & NEUROLOGY

## 2021-04-05 NOTE — PROGRESS NOTES
NEUROLOGY  NEW PATIENT EVALUATION/CONSULTATION       PATIENT NAME: Carina Daniel    MRN: [id number]    REASON FOR CONSULTATION: Atypical leg movements during sleep    04/05/21      Previous records (physician notes, laboratory reports, and radiology reports) and imaging studies were reviewed and summarized. My recommendations will be communicated back to the patient's physician(s) via electronic medical record and/or by [facility] [facility] mail. HISTORY OF PRESENT ILLNESS:  Carina Daniel is a 63 y.o.  male presenting for evaluation of lower extremity movements during sleep x 6 months now, stable since onset. His wife notes this primarily in the evenings when sleeping in bed. Patient is not particularly bothered by these movements. Leg movements appear more prominent when exercising/jogging later in the evenings. He mentions prior RLS-like symptoms since his teens- feels the need to move the legs, worse in the evenings involving the RLE typically. No prior medications, although he was told to take iron supplements in the past for symptoms which did help initially. Denies extremity weakness, LBP or paresthesias. PAST MEDICAL HISTORY:  Past Medical History:   Diagnosis Date    CAD (coronary artery disease)     Heart failure ([facility] [address]     S/P coronary artery stent placement 12/1/2014 12/1/14 PCI/LISE to OM1       PAST SURGICAL HISTORY:  Past Surgical History:   Procedure Laterality Date    CARDIAC SURG PROCEDURE UNLIST      3 stents placed Jan 2005.  HX OTHER SURGICAL      Cyst removed from next.        FAMILY HISTORY:   Family History   Problem Relation Age of Onset    Hypertension Mother     Heart Attack Maternal Grandmother          SOCIAL HISTORY:  Social History     Socioeconomic History    Marital status:      Spouse name: Not on file    Number of children: Not on file    Years of education: Not on file    Highest education level: Not on file   Tobacco Use    Smoking status: Former Smoker    Smokeless tobacco: Never Used   Substance and Sexual Activity    Alcohol use: Yes     Comment: Daily    Drug use: No         MEDICATIONS:   Current Outpatient Medications   Medication Sig Dispense Refill    atorvastatin (LIPITOR) 40 mg tablet TAKE 1 TABLET BY MOUTH EVERY DAY 90 Tab 3    valsartan-hydroCHLOROthiazide (DIOVAN-HCT) 160-25 mg per tablet TAKE 1 TABLET BY MOUTH EVERY DAY 90 Tab 0    IRON, FERROUS SULFATE, PO Take  by mouth daily.  carvedilol (COREG) 12.5 mg tablet TAKE 1 TABLET BY MOUTH TWICE DAILY WITH FOOD 180 Tab 0    aspirin delayed-release (ASPIR-LOW) 81 mg tablet Take 1 Tab by mouth daily. 90 Tab 6    ascorbic acid (VITAMIN C) 1,000 mg tablet Take 1,000 mg by mouth daily.  fish oil-dha-epa 1,200-144-216 mg cap Take 1,000 mg by mouth daily.  folic acid 986 mcg tablet Take 800 mcg by mouth daily.  multivitamin (ONE A DAY) tablet Take 1 Tab by mouth daily. ALLERGIES:  No Known Allergies      REVIEW OF SYSTEMS:  10 point ROS reviewed with patient. Please see scanned document under media. +decline in recall per wife    PHYSICAL EXAM:  Vital Signs:   Visit Vitals  /70   Pulse 70   Resp 18   SpO2 98%        General Medical Exam:  General:  Well appearing, comfortable, in no apparent distress. Eyes/ENT: see cranial nerve examination. Neck: No masses appreciated. Full range of motion without tenderness. Respiratory:  Clear to auscultation, good air entry bilaterally. Cardiac:  Regular rate and rhythm, no murmur. GI:  Soft, non-tender, non-distended abdomen. Bowel sounds normal. No masses, organomegaly. Extremities:  No deformities, edema, or skin discoloration. Skin:  No rashes or lesions. Neurological:  · Mental Status:  Alert and oriented to person, place, and time with fluent speech. MOCA 30/30. · Cranial Nerves:   CNII/III/IV/VI: visual fields full to confrontation, EOMI, PERRL, no ptosis or nystagmus.    CN V: Facial sensation intact bilaterally, masseter 5/5   CN VII: Facial muscles symmetric and strong   CN VIII: Hears finger rub well bilaterally, intact vestibular function   CN IX/X: Normal palatal movement   CN XI: Full strength shoulder shrug bilaterally   CN XII: Tongue protrusion full and midline without fasciculation or atrophy  · Motor: Normal tone and muscle bulk with no pronator drift. Individual muscle group testing:  Shoulder abduction:   Left:5/5   Right : 5/5    Shoulder adduction:   Left:5/5   Right : 5/5    Elbow flexion:      Left:5/5   Right : 5/5  Elbow extension:    Left:5/5   Right : 5/5   Wrist flexion:    Left:5/5   Right : 5/5  Wrist extension:    Left:5/5   Right : 5/5  Arm pronation:   Left:5/5   Right : 5/5  Arm supination:   Left:5/5   Right : 5/5    Finger flexion:    Left:5/5   Right : 5/5    Finger extension:   Left:5/5   Right : 5/5   Finger abduction:  Left:5/5   Right : 5/5   Finger adduction:   Left:5/5   Right : 5/5  Hip flexion:     Left:5/5   Right : 5/5         Hip extension:   Left:5/5   Right : 5/5    Knee flexion:    Left:5/5   Right : 5/5    Knee extension:   Left:5/5   Right : 5/5    Dorsiflexion:     Left:5/5   Right : 5/5  Plantar flexion:    Left:5/5   Right : 5/5      · MSRs: No crossed adductors or clonus. RIGHT  LEFT   Brachioradialis 2+ 2+   Biceps 2+ 2+   Triceps 2+ 2+   Knee 2+ 2+   Achilles 1+ 1+        Plantar response Downward Downward          · Sensation: Normal and symmetric perception of pinprick, temperature, light touch, proprioception, and vibration; (-) Romberg. · Coordination: No dysmetria. Normal rapid alternating movements; finger-to-nose and heel-to- shin testing are within normal limits. · Gait: Normal native gait    PERTINENT DATA:  INTERNAL RECORDS:  The patient's electronic medical record was reviewed. The relevant details include:   MRI Results (maximum last 3):   Results from [place] encounter on 02/05/16   MRI CERV SPINE WO CONT    Narrative **Final Report**       ICD Codes / Adm. Diagnosis: 728.87  M62.81 / Muscle weakness (generalized)    Muscle weakness (generalized)  Examination:  MR SMYTH SPINE WO CON  - [id number] - Feb 5 2016  3:15PM  Accession No:  [id number]  Reason:  distal RUE weakness      REPORT:  EXAM:  MR SMYTH SPINE WO CON  INDICATION:  distal RUE weakness, loss mobility right hand, M62.81, right   wrist pain  TECHNIQUE: Sagittal T1, T2, STIR and axial T1 and T2-weighted images of the   cervical spine were obtained. COMPARISON: None available. FINDINGS:  The cervical vertebra are in good alignment  The craniocervical junction is  unremarkable. C2-C3: No disc bulge or stenosis. The right facet joint has fused. C3-C4: Mild loss of disc space height and signal greater on the right. Minimal disc bulge and endplate hypertrophy. Chronic right facet arthrosis. At least mild right foramina stenosis. C4-C5: Loss of disc space height. Mild diffuse disc bulge and endplate   osteophyte formation. Mild stenosis without cord compression. Left foramina   stenosis. C5-C6: Chronic near total loss of disc space height with mild endplate   osteophyte formation. Mild stenosis without cord compression. Left foramina   stenosis. C6-C7: Chronic near total loss of disc space height with some residual disc   bulging and mild endplate hypertrophy. Minimal stenosis. Mild foramina   stenosis. .  C7-T1: No significant disc bulge or stenosis. Minimal/mild facet arthrosis. T1-T2: No significant disc bulge or stenosis. The spinal cord has normal signal throughout. IMPRESSION:   1. Chronic findings of cervical spondylosis C4-5, C5-6 and C6-7 with   minimal/mild stenosis, no cord compression and left foramina stenosis C4-5   and C5-6.  2. C3-4 right greater than left facet arthrosis. Minimal stenosis. 3. C2-3 acquired chronic fusion right facet joint.               Signing/Reading Doctor: Ant Blue ([id number])    Approved: Ant Blue ([id number])  Feb 5 2016  3:53PM                                   ASSESSMENT:      ICD-10-CM ICD-9-CM    1. Periodic limb movements of sleep  G47.61 327.51 FERRITIN   2. RLS (restless legs syndrome)  G25.81 333.94 FERRITIN   63year old male previously seen over 3 year ago for b/l CTS L>R s/p release returning for follow up due to periodic limb movements of sleep and very mild RLS in the evenings. Symptoms do not appear bothersome to the patient currently, however his wife notes his excessive movements during sleep. Neurological examination is non-focal today. We will obtain ferritin levels and make any necessary adjustments to iron supplementation, as iron deficiency may contribute to above symptoms. He will attempt to avoid evening exercise and continue to abstain from stimulants/caffeine. If any worsening symptoms, or if PLMS/RLS becomes bothersome to the patient, may consider pharmacologic therapy. PLAN:  · Obtain Ferritin level  · Avoid evening exercise    Follow-up and Dispositions    · Return if symptoms worsen or fail to improve. I have discussed the diagnosis with the patient today and the intended plan as seen in the above orders with both the patient as well as referring provider and/or PCP via electronic correspondence. The patient has received an after-visit summary and questions were answered concerning future plans. Grazyna Kelsey,   Staff Neurologist  Diplomate, [address] [place] [address] Board of Psychiatry & Neurology

## 2021-04-05 NOTE — PATIENT INSTRUCTIONS
PRESCRIPTION REFILL POLICY [facility] Statement to Patients April 1, 2014 In an effort to ensure the large volume of patient prescription refills is processed in the most efficient and expeditious manner, we are asking our patients to assist us by calling your Pharmacy for all prescription refills, this will include also your  Mail Order Pharmacy. The pharmacy will contact our office electronically to continue the refill process. Please do not wait until the last minute to call your pharmacy. We need at least 48 hours (2days) to fill prescriptions. We also encourage you to call your pharmacy before going to  your prescription to make sure it is ready. With regard to controlled substance prescription refill requests (narcotic refills) that need to be picked up at our office, we ask your cooperation by providing us with at least 72 hours (3days) notice that you will need a refill. We will not refill narcotic prescription refill requests after 4:00pm on any weekday, Monday through Thursday, or after 2:00pm on Fridays, or on the weekends. We encourage everyone to explore another way of getting your prescription refill request processed using [employer], our patient web portal through our electronic medical record system. [employer] is an efficient and effective way to communicate your medication request directly to the office and  downloadable as an fernando on your smart phone . [employer] also features a review functionality that allows you to view your medication list as well as leave messages for your physician. Are you ready to get connected? If so please review the attatched instructions or speak to any of our staff to get you set up right away! Thank you so much for your cooperation. Should you have any questions please contact our Practice Administrator. The Physicians and Staff,  [facility]

## 2021-04-06 LAB — FERRITIN SERPL-MCNC: 184 NG/ML (ref 30–400)

## 2024-09-19 ENCOUNTER — TRANSCRIBE ORDERS (OUTPATIENT)
Facility: HOSPITAL | Age: 69
End: 2024-09-19

## 2024-09-19 DIAGNOSIS — R10.819 INGUINAL TENDERNESS: ICD-10-CM

## 2024-09-19 DIAGNOSIS — R10.31 RIGHT INGUINAL PAIN: Primary | ICD-10-CM

## 2024-11-05 ENCOUNTER — TRANSCRIBE ORDERS (OUTPATIENT)
Dept: SCHEDULING | Age: 69
End: 2024-11-05

## 2024-11-05 ENCOUNTER — TRANSCRIBE ORDERS (OUTPATIENT)
Facility: HOSPITAL | Age: 69
End: 2024-11-05

## 2024-11-05 DIAGNOSIS — R10.819 INGUINAL TENDERNESS: ICD-10-CM

## 2024-11-05 DIAGNOSIS — R10.31 RT INGUINAL PAIN: Primary | ICD-10-CM

## 2024-11-05 DIAGNOSIS — R10.31 RIGHT INGUINAL PAIN: Primary | ICD-10-CM

## 2024-11-13 ENCOUNTER — HOSPITAL ENCOUNTER (OUTPATIENT)
Facility: HOSPITAL | Age: 69
Discharge: HOME | End: 2024-11-16
Payer: COMMERCIAL

## 2024-11-13 DIAGNOSIS — R10.819 INGUINAL TENDERNESS: ICD-10-CM

## 2024-11-13 DIAGNOSIS — R10.31 RT INGUINAL PAIN: ICD-10-CM

## 2024-11-13 PROCEDURE — 76705 ECHO EXAM OF ABDOMEN: CPT
